# Patient Record
Sex: MALE | Race: WHITE | Employment: FULL TIME | URBAN - NONMETROPOLITAN AREA
[De-identification: names, ages, dates, MRNs, and addresses within clinical notes are randomized per-mention and may not be internally consistent; named-entity substitution may affect disease eponyms.]

---

## 2017-08-07 ENCOUNTER — OFFICE VISIT (OUTPATIENT)
Dept: FAMILY MEDICINE CLINIC | Facility: CLINIC | Age: 47
End: 2017-08-07

## 2017-08-07 VITALS
BODY MASS INDEX: 27.3 KG/M2 | OXYGEN SATURATION: 99 % | SYSTOLIC BLOOD PRESSURE: 138 MMHG | HEART RATE: 74 BPM | HEIGHT: 74.25 IN | DIASTOLIC BLOOD PRESSURE: 74 MMHG | TEMPERATURE: 98 F | WEIGHT: 215 LBS

## 2017-08-07 DIAGNOSIS — K04.7 DENTAL INFECTION: Primary | ICD-10-CM

## 2017-08-07 DIAGNOSIS — K64.4 EXTERNAL HEMORRHOID: ICD-10-CM

## 2017-08-07 PROCEDURE — 99202 OFFICE O/P NEW SF 15 MIN: CPT | Performed by: FAMILY MEDICINE

## 2017-08-07 RX ORDER — PENICILLIN V POTASSIUM 250 MG/1
250 TABLET ORAL 4 TIMES DAILY
Qty: 40 TABLET | Refills: 0 | Status: SHIPPED | OUTPATIENT
Start: 2017-08-07 | End: 2017-09-21 | Stop reason: ALTCHOICE

## 2017-08-07 NOTE — PROGRESS NOTES
Radha Torrez. is a 55year old male.   Patient presents with:  Establish Care: RM 5  Rectal Bleeding: PAIN---- FREQ TRIPS TO [facility]---       HPI:   Pt here   New patient    He was incarcerated for 4 years    States he has had a significant isssue BS's,no masses, HSM or tenderness  No rectal or anal exam performed    EXTREMITIES: no cyanosis, clubbing or edema    ASSESSMENT AND PLAN:     External hemorrhoid  Dental infection  (primary encounter diagnosis)    Problem List Items Addressed This Visit

## 2017-08-07 NOTE — PATIENT INSTRUCTIONS
Hemorrhoids    Hemorrhoids are swollen and inflamed veins inside the rectum and near the anus. The rectum is the last several inches of the colon. The anus is the passage between the rectum and the outside of the body.   Causes  The veins can become swoll ¨ Take a fiber supplement or bulking agent, if advised to by your provider. These include products such as psyllium or methylcellulose. · Drink plenty of water, if directed to by your provider. This can help keep stool soft. · Be more active.  Frequent ex

## 2017-08-23 ENCOUNTER — OFFICE VISIT (OUTPATIENT)
Dept: SURGERY | Facility: CLINIC | Age: 47
End: 2017-08-23

## 2017-08-23 VITALS
HEART RATE: 59 BPM | BODY MASS INDEX: 25.49 KG/M2 | HEIGHT: 75 IN | SYSTOLIC BLOOD PRESSURE: 126 MMHG | DIASTOLIC BLOOD PRESSURE: 78 MMHG | WEIGHT: 205 LBS | TEMPERATURE: 99 F

## 2017-08-23 DIAGNOSIS — K64.9 HEMORRHOIDS, UNSPECIFIED HEMORRHOID TYPE: Primary | ICD-10-CM

## 2017-08-23 PROCEDURE — 99244 OFF/OP CNSLTJ NEW/EST MOD 40: CPT | Performed by: SURGERY

## 2017-08-23 NOTE — H&P
New Patient Visit Note       Active Problems      No diagnosis found. Chief Complaint   Patient presents with:  Hemorrhoids: New patient here for rectal bleeding. c/o rectal bleeding with and with out bowel movements and rectal pain.  Last colonoscopy do Rfl: 0         Review of Systems  The Review of Systems has been reviewed by me during today. Review of Systems   Constitutional: Negative for chills, diaphoresis, fatigue, fever and unexpected weight change.    HENT: Negative for hearing loss, nosebleeds, lymphadenopathy. Rectal pos grade 4 hemorrhoids with early rectal prolapse             Assessment   No diagnosis found.     Grade 4 hemorrhoidal disease with early rectal prolapse  Plan   Anal examination under anesthesia with closed hemorrhoidectomy discu

## 2017-09-21 RX ORDER — MULTIVITAMIN
TABLET ORAL
COMMUNITY

## 2017-09-21 RX ORDER — MENTHOL 5.8 MG/1
LOZENGE ORAL
COMMUNITY

## 2017-09-21 RX ORDER — MELATONIN: COMMUNITY

## 2017-09-21 RX ORDER — MULTIVIT WITH MINERALS/LUTEIN
1000 TABLET ORAL DAILY
COMMUNITY

## 2017-09-25 ENCOUNTER — TELEPHONE (OUTPATIENT)
Dept: FAMILY MEDICINE CLINIC | Facility: CLINIC | Age: 47
End: 2017-09-25

## 2017-09-25 ENCOUNTER — TELEPHONE (OUTPATIENT)
Dept: SURGERY | Facility: CLINIC | Age: 47
End: 2017-09-25

## 2017-09-25 DIAGNOSIS — K64.5 THROMBOSED HEMORRHOIDS: Primary | ICD-10-CM

## 2017-09-25 NOTE — TELEPHONE ENCOUNTER
Per Dr Jaison Mosquera unable to prescribe pain medication and will need to continue to use the Aleve and/or Tylenol for the pain. Ok to call in Dibucaine ointment to apply to the area.      LM for pt advising of ointment and that it was sent to [facility] in [place]

## 2017-09-25 NOTE — TELEPHONE ENCOUNTER
BARON WANTS TO KNOW IF HE CAN GET HIS SURGERY MOVED UP? HE CAN BARELY WALK AND THE BLEEDING IS GETTING WORSE.

## 2017-09-25 NOTE — TELEPHONE ENCOUNTER
Pt aware I can move his procedure up to 10/2/17 at [facility]. Pt v/u and will take that date. Pt states he is using Aleve for the pain and that his hemorrhoids are bleeding a lot. Aleve and Tylenol are not working for the pain.  The pt wants to know if Dr Steven Sanchez ca

## 2017-09-27 ENCOUNTER — TELEPHONE (OUTPATIENT)
Dept: FAMILY MEDICINE CLINIC | Facility: CLINIC | Age: 47
End: 2017-09-27

## 2017-09-27 NOTE — TELEPHONE ENCOUNTER
CPT: [id number], [id number]    I call [employer] and used automated system. No auth required for out-pt surgery.  jersey

## 2017-10-02 ENCOUNTER — ANESTHESIA EVENT (OUTPATIENT)
Dept: SURGERY | Facility: HOSPITAL | Age: 47
End: 2017-10-02
Payer: COMMERCIAL

## 2017-10-02 ENCOUNTER — SURGERY (OUTPATIENT)
Age: 47
End: 2017-10-02

## 2017-10-02 ENCOUNTER — HOSPITAL ENCOUNTER (OUTPATIENT)
Facility: HOSPITAL | Age: 47
Setting detail: HOSPITAL OUTPATIENT SURGERY
Discharge: HOME | End: 2017-10-02
Attending: SURGERY | Admitting: SURGERY
Payer: COMMERCIAL

## 2017-10-02 ENCOUNTER — ANESTHESIA (OUTPATIENT)
Dept: SURGERY | Facility: HOSPITAL | Age: 47
End: 2017-10-02
Payer: COMMERCIAL

## 2017-10-02 VITALS
OXYGEN SATURATION: 100 % | WEIGHT: 211.31 LBS | TEMPERATURE: 98 F | HEART RATE: 46 BPM | DIASTOLIC BLOOD PRESSURE: 76 MMHG | SYSTOLIC BLOOD PRESSURE: 116 MMHG | HEIGHT: 74 IN | BODY MASS INDEX: 27.12 KG/M2 | RESPIRATION RATE: 16 BRPM

## 2017-10-02 DIAGNOSIS — K64.9 HEMORRHOIDS, UNSPECIFIED HEMORRHOID TYPE: ICD-10-CM

## 2017-10-02 PROCEDURE — 88304 TISSUE EXAM BY PATHOLOGIST: CPT | Performed by: SURGERY

## 2017-10-02 PROCEDURE — 06BY0ZC EXCISION OF HEMORRHOIDAL PLEXUS, OPEN APPROACH: ICD-10-PCS | Performed by: SURGERY

## 2017-10-02 RX ORDER — HYDROCODONE BITARTRATE AND ACETAMINOPHEN 5; 325 MG/1; MG/1
1 TABLET ORAL AS NEEDED
Status: COMPLETED | OUTPATIENT
Start: 2017-10-02 | End: 2017-10-02

## 2017-10-02 RX ORDER — SODIUM CHLORIDE, SODIUM LACTATE, POTASSIUM CHLORIDE, CALCIUM CHLORIDE 600; 310; 30; 20 MG/100ML; MG/100ML; MG/100ML; MG/100ML
INJECTION, SOLUTION INTRAVENOUS CONTINUOUS
Status: DISCONTINUED | OUTPATIENT
Start: 2017-10-02 | End: 2017-10-02

## 2017-10-02 RX ORDER — METOCLOPRAMIDE HYDROCHLORIDE 5 MG/ML
INJECTION INTRAMUSCULAR; INTRAVENOUS
Status: DISCONTINUED | OUTPATIENT
Start: 2017-10-02 | End: 2017-10-02 | Stop reason: HOSPADM

## 2017-10-02 RX ORDER — ONDANSETRON 2 MG/ML
4 INJECTION INTRAMUSCULAR; INTRAVENOUS AS NEEDED
Status: DISCONTINUED | OUTPATIENT
Start: 2017-10-02 | End: 2017-10-02

## 2017-10-02 RX ORDER — HYDROMORPHONE HYDROCHLORIDE 1 MG/ML
0.4 INJECTION, SOLUTION INTRAMUSCULAR; INTRAVENOUS; SUBCUTANEOUS EVERY 5 MIN PRN
Status: DISCONTINUED | OUTPATIENT
Start: 2017-10-02 | End: 2017-10-02

## 2017-10-02 RX ORDER — HYDROCODONE BITARTRATE AND ACETAMINOPHEN 5; 325 MG/1; MG/1
TABLET ORAL
Status: DISCONTINUED
Start: 2017-10-02 | End: 2017-10-02

## 2017-10-02 RX ORDER — HYDROCODONE BITARTRATE AND ACETAMINOPHEN 5; 325 MG/1; MG/1
1 TABLET ORAL EVERY 6 HOURS PRN
Qty: 30 TABLET | Refills: 0 | Status: SHIPPED | OUTPATIENT
Start: 2017-10-02 | End: 2018-02-19 | Stop reason: ALTCHOICE

## 2017-10-02 RX ORDER — DEXAMETHASONE SODIUM PHOSPHATE 4 MG/ML
VIAL (ML) INJECTION
Status: DISCONTINUED | OUTPATIENT
Start: 2017-10-02 | End: 2017-10-02 | Stop reason: HOSPADM

## 2017-10-02 RX ORDER — MIDAZOLAM HYDROCHLORIDE 1 MG/ML
1 INJECTION INTRAMUSCULAR; INTRAVENOUS EVERY 5 MIN PRN
Status: DISCONTINUED | OUTPATIENT
Start: 2017-10-02 | End: 2017-10-02

## 2017-10-02 RX ORDER — HYDROMORPHONE HYDROCHLORIDE 1 MG/ML
INJECTION, SOLUTION INTRAMUSCULAR; INTRAVENOUS; SUBCUTANEOUS
Status: COMPLETED
Start: 2017-10-02 | End: 2017-10-02

## 2017-10-02 RX ORDER — BUPIVACAINE HYDROCHLORIDE 2.5 MG/ML
INJECTION, SOLUTION EPIDURAL; INFILTRATION; INTRACAUDAL AS NEEDED
Status: DISCONTINUED | OUTPATIENT
Start: 2017-10-02 | End: 2017-10-02 | Stop reason: HOSPADM

## 2017-10-02 RX ORDER — MEPERIDINE HYDROCHLORIDE 25 MG/ML
12.5 INJECTION INTRAMUSCULAR; INTRAVENOUS; SUBCUTANEOUS AS NEEDED
Status: DISCONTINUED | OUTPATIENT
Start: 2017-10-02 | End: 2017-10-02

## 2017-10-02 RX ORDER — HYDROCODONE BITARTRATE AND ACETAMINOPHEN 5; 325 MG/1; MG/1
2 TABLET ORAL AS NEEDED
Status: COMPLETED | OUTPATIENT
Start: 2017-10-02 | End: 2017-10-02

## 2017-10-02 RX ORDER — HEPARIN SODIUM 5000 [USP'U]/ML
5000 INJECTION, SOLUTION INTRAVENOUS; SUBCUTANEOUS ONCE
Status: COMPLETED | OUTPATIENT
Start: 2017-10-02 | End: 2017-10-02

## 2017-10-02 RX ORDER — DIBUCAINE 0.28 G/28G
1 OINTMENT TOPICAL AS NEEDED
Qty: 1 TUBE | Refills: 0 | Status: SHIPPED | OUTPATIENT
Start: 2017-10-02 | End: 2018-02-19 | Stop reason: ALTCHOICE

## 2017-10-02 RX ORDER — NALOXONE HYDROCHLORIDE 0.4 MG/ML
80 INJECTION, SOLUTION INTRAMUSCULAR; INTRAVENOUS; SUBCUTANEOUS AS NEEDED
Status: DISCONTINUED | OUTPATIENT
Start: 2017-10-02 | End: 2017-10-02

## 2017-10-02 RX ORDER — CEFAZOLIN SODIUM 1 G/3ML
INJECTION, POWDER, FOR SOLUTION INTRAMUSCULAR; INTRAVENOUS
Status: DISCONTINUED | OUTPATIENT
Start: 2017-10-02 | End: 2017-10-02 | Stop reason: HOSPADM

## 2017-10-02 RX ORDER — ONDANSETRON 2 MG/ML
INJECTION INTRAMUSCULAR; INTRAVENOUS
Status: DISCONTINUED | OUTPATIENT
Start: 2017-10-02 | End: 2017-10-02 | Stop reason: HOSPADM

## 2017-10-02 NOTE — BRIEF OP NOTE
Pre-Operative Diagnosis: hemorrhoids      Post-Operative Diagnosis:grade 4     Procedure Performed:   Procedure(s):  ANAL EXAMINATION UNDER ANESTHESIA; HEMORRHOIDECTOMY closed times three    Surgeon(s) and Role:     DO Senthil Salcedo    Ass

## 2017-10-02 NOTE — H&P
Chief Complaint   Patient presents with:  Hemorrhoids: New patient here for rectal bleeding. c/o rectal bleeding with and with out bowel movements and rectal pain.  Last colonoscopy done 2014--normal.         History of Present Illness      Pt with h/o rect Systems has been reviewed by me during today. Review of Systems   Constitutional: Negative for chills, diaphoresis, fatigue, fever and unexpected weight change. HENT: Negative for hearing loss, nosebleeds, sore throat and trouble swallowing.     Respirat with early rectal prolapse                  Assessment   No diagnosis found.     Grade 4 hemorrhoidal disease with early rectal prolapse  Plan   Anal examination under anesthesia with closed hemorrhoidectomy discussed with patient risk of incontinence and

## 2017-10-02 NOTE — ANESTHESIA PREPROCEDURE EVALUATION
PRE-OP EVALUATION    Patient Name: Danielle Chaudhary.    Pre-op Diagnosis: Hemorrhoids, unspecified hemorrhoid type [K64.9]    Procedure(s):  ANAL EXAMINATION UNDER ANESTHESIA; HEMORRHOIDECTOMY     Surgeon(s) and Role:     Chris Rehman, DO - Primary Drug use: No     Available pre-op labs reviewed.                Airway      Mallampati: II  Mouth opening: 3 FB  TM distance: 4 - 6 cm  Neck ROM: full Cardiovascular    Cardiovascular exam normal.  Rhythm: regular  Rate: normal     Dental    No notabl

## 2017-10-02 NOTE — OR PREOP
Patient and family informed the physician delay. Comfort measure given,all questions are answered. Call light within reach.  Will be update as need

## 2017-10-02 NOTE — ANESTHESIA POSTPROCEDURE EVALUATION
[address]  Patient Status:  Hospital Outpatient Surgery   Age/Gender 52year old male MRN [id number]   [facility] SURGERY Attending Marian Aburto, [address] [facility] Day # 0 PCP James Robertson MD       Anesthesia Post-op Not

## 2017-10-03 ENCOUNTER — TELEPHONE (OUTPATIENT)
Dept: FAMILY MEDICINE CLINIC | Facility: CLINIC | Age: 47
End: 2017-10-03

## 2017-10-03 NOTE — TELEPHONE ENCOUNTER
Pt spoke to Dr Gopi Ryder and states the pt needs to stop sitting on the toilet as he is pushing to have BM and there is nothing there. Pt needs to lay on the couch apply ice to the rectum to help with the swelling.  He advised the pt to stop the milk of mag and

## 2017-11-06 NOTE — OPERATIVE REPORT
[address]    PATIENT'S NAME: Gricelda French   ATTENDING PHYSICIAN: Jing Fontaine D.O.   OPERATING PHYSICIAN: Jing Fontaine D.O.   PATIENT ACCOUNT#:   [de-identified]    LOCATION:  [address] [place] [address] [address] #:   [id number] position. Gelfoam dibucaine pack placed in the anal canal.  Marcaine plain 0.5% 10 mL injected into the incisions at the completion of the procedure. The patient was transported from the operative suite to recovery in stable condition.     Dictated By Farzana Dougherty

## 2018-02-16 ENCOUNTER — TELEPHONE (OUTPATIENT)
Dept: FAMILY MEDICINE CLINIC | Facility: CLINIC | Age: 48
End: 2018-02-16

## 2018-02-16 NOTE — TELEPHONE ENCOUNTER
Advised needs evaluation. Appointment scheduled.   Future Appointments  Date Time Provider Anuja Ferraroi   2/19/2018 4:00 PM Ann Senior MD [facility]

## 2018-02-19 ENCOUNTER — OFFICE VISIT (OUTPATIENT)
Dept: FAMILY MEDICINE CLINIC | Facility: CLINIC | Age: 48
End: 2018-02-19

## 2018-02-19 VITALS
DIASTOLIC BLOOD PRESSURE: 80 MMHG | TEMPERATURE: 98 F | HEART RATE: 72 BPM | WEIGHT: 213 LBS | OXYGEN SATURATION: 98 % | BODY MASS INDEX: 27 KG/M2 | SYSTOLIC BLOOD PRESSURE: 138 MMHG

## 2018-02-19 DIAGNOSIS — S43.401D SPRAIN OF RIGHT SHOULDER, UNSPECIFIED SHOULDER SPRAIN TYPE, SUBSEQUENT ENCOUNTER: Primary | ICD-10-CM

## 2018-02-19 PROCEDURE — 99213 OFFICE O/P EST LOW 20 MIN: CPT | Performed by: FAMILY MEDICINE

## 2018-02-19 NOTE — PROGRESS NOTES
Meryl Frey. is a 52year old male.   Patient presents with:  Return To Work: NEEDS RELEASE---SHOULDER STRAIN---- RM 5      HPI:   Here for follow up from shoulder injury    To ret to work as healed  Now    [address] 10 d ago    See notes b Packs/day: 1.00      Years: 32.00     Smokeless tobacco: Never Used                      Alcohol use: Yes           1.2 oz/week     Cans of beer: 2 per week     Comment: a few on weekends       BP Readings from L

## 2021-11-01 PROCEDURE — 31231 NASAL ENDOSCOPY DX: CPT | Performed by: OTOLARYNGOLOGY

## 2021-11-01 PROCEDURE — 99222 1ST HOSP IP/OBS MODERATE 55: CPT | Performed by: OTOLARYNGOLOGY

## 2022-03-29 NOTE — TELEPHONE ENCOUNTER
SEEN AT [facility] ER LAST WEEK FOR SHOULDER, PUT ON LIGHT DUTY AND WANTS TO RETURN TO WORK WITHOUT RESTRICTIONS ON Monday. HE HAS NO INSURANCE COVERAGE AT THIS TIME AND CANNOT AFFORD AN OFFICE VISIT.    PLEASE ADVISE 1 Principal Discharge DX:	Small bowel obstruction

## (undated) DEVICE — ABDOMINAL PAD: Brand: DERMACEA

## (undated) DEVICE — SUTURE CHROMIC GUT 2-0 SH

## (undated) DEVICE — SOL  .9 1000ML BTL

## (undated) DEVICE — LUBRICANT JLY SURGILUBE 2OZ

## (undated) DEVICE — KENDALL SCD EXPRESS SLEEVES, KNEE LENGTH, MEDIUM: Brand: KENDALL SCD

## (undated) DEVICE — RECTAL CDS-LF: Brand: MEDLINE INDUSTRIES, INC.

## (undated) DEVICE — GLOVE BIOGEL M SURG SZ 71/2

## (undated) DEVICE — SUTURE CHROMIC GUT 3-0 SH

## (undated) DEVICE — PAD ULNAR NERVE PROTECTOR

## (undated) DEVICE — 3M(TM) MICROPORE TAPE DISPENSER 1535-2: Brand: 3M™ MICROPORE™

## (undated) NOTE — Clinical Note
Omar Nyhan saw Sienna Santoro in office today. As you know he is a long-standing history of hemorrhoidal disease. On examination he has grade 4 hemorrhoids. I am scheduling him for a hemorrhoidectomy.   Thank you Drea Márquez

## (undated) NOTE — LETTER
2014 [facility], [address] [place] [id number]  [phone]        Date: 2/19/2018      Patient Name: Adelina Scott. To Whom it may concern:     This letter has been written at the pa